# Patient Record
Sex: MALE | Race: WHITE | Employment: OTHER | URBAN - METROPOLITAN AREA
[De-identification: names, ages, dates, MRNs, and addresses within clinical notes are randomized per-mention and may not be internally consistent; named-entity substitution may affect disease eponyms.]

---

## 2017-05-30 DIAGNOSIS — I10 ESSENTIAL HYPERTENSION: ICD-10-CM

## 2017-05-31 NOTE — TELEPHONE ENCOUNTER
Amlodipine      Last Written Prescription Date: 2/27/17  Last Fill Quantity: 90, # refills: 0  Last Office Visit with [facility], [facility], [facility] or [facility] prescribing provider: 11/21/16       Potassium   Date Value Ref Range Status   11/25/2016 4.9 3.4 - 5.3 mmol/L Final     Creatinine   Date Value Ref Range Status   11/25/2016 1.12 0.66 - 1.25 mg/dL Final     BP Readings from Last 3 Encounters:   11/21/16 142/80   06/07/16 148/88   05/12/16 140/78

## 2017-06-01 RX ORDER — AMLODIPINE BESYLATE 10 MG/1
TABLET ORAL
Qty: 90 TABLET | Refills: 1 | Status: SHIPPED | OUTPATIENT
Start: 2017-06-01 | End: 2017-08-14

## 2017-08-14 ENCOUNTER — OFFICE VISIT (OUTPATIENT)
Dept: FAMILY MEDICINE | Facility: OTHER | Age: 62
End: 2017-08-14
Attending: NURSE PRACTITIONER
Payer: COMMERCIAL

## 2017-08-14 VITALS
BODY MASS INDEX: 30.91 KG/M2 | WEIGHT: 220.8 LBS | TEMPERATURE: 96.4 F | DIASTOLIC BLOOD PRESSURE: 78 MMHG | OXYGEN SATURATION: 98 % | HEIGHT: 71 IN | HEART RATE: 67 BPM | RESPIRATION RATE: 18 BRPM | SYSTOLIC BLOOD PRESSURE: 142 MMHG

## 2017-08-14 DIAGNOSIS — Z72.0 TOBACCO ABUSE: Primary | ICD-10-CM

## 2017-08-14 DIAGNOSIS — M79.672 LEFT FOOT PAIN: ICD-10-CM

## 2017-08-14 DIAGNOSIS — E11.9 TYPE 2 DIABETES MELLITUS WITHOUT COMPLICATION, WITHOUT LONG-TERM CURRENT USE OF INSULIN (H): ICD-10-CM

## 2017-08-14 DIAGNOSIS — E78.5 HYPERLIPIDEMIA LDL GOAL <100: ICD-10-CM

## 2017-08-14 DIAGNOSIS — I10 BENIGN ESSENTIAL HYPERTENSION: ICD-10-CM

## 2017-08-14 DIAGNOSIS — I10 ESSENTIAL HYPERTENSION: ICD-10-CM

## 2017-08-14 DIAGNOSIS — Z71.6 TOBACCO ABUSE COUNSELING: ICD-10-CM

## 2017-08-14 LAB
ALBUMIN SERPL-MCNC: 4.1 G/DL (ref 3.4–5)
ALBUMIN UR-MCNC: ABNORMAL MG/DL
ALP SERPL-CCNC: 67 U/L (ref 40–150)
ALT SERPL W P-5'-P-CCNC: 37 U/L (ref 0–70)
ANION GAP SERPL CALCULATED.3IONS-SCNC: 10 MMOL/L (ref 3–14)
APPEARANCE UR: CLEAR
AST SERPL W P-5'-P-CCNC: 44 U/L (ref 0–45)
BILIRUB DIRECT SERPL-MCNC: 0.2 MG/DL (ref 0–0.2)
BILIRUB SERPL-MCNC: 0.5 MG/DL (ref 0.2–1.3)
BILIRUB UR QL STRIP: NEGATIVE
BUN SERPL-MCNC: 18 MG/DL (ref 7–30)
CALCIUM SERPL-MCNC: 9.2 MG/DL (ref 8.5–10.1)
CHLORIDE SERPL-SCNC: 105 MMOL/L (ref 94–109)
CHOLEST SERPL-MCNC: 223 MG/DL
CO2 SERPL-SCNC: 22 MMOL/L (ref 20–32)
COLOR UR AUTO: YELLOW
CREAT SERPL-MCNC: 1 MG/DL (ref 0.66–1.25)
CREAT UR-MCNC: 114 MG/DL
EST. AVERAGE GLUCOSE BLD GHB EST-MCNC: 163 MG/DL
GFR SERPL CREATININE-BSD FRML MDRD: 75 ML/MIN/1.7M2
GLUCOSE SERPL-MCNC: 184 MG/DL (ref 70–99)
GLUCOSE UR STRIP-MCNC: NEGATIVE MG/DL
HBA1C MFR BLD: 7.3 % (ref 4.3–6)
HDLC SERPL-MCNC: 33 MG/DL
HGB UR QL STRIP: NEGATIVE
KETONES UR STRIP-MCNC: NEGATIVE MG/DL
LDLC SERPL CALC-MCNC: 157 MG/DL
LEUKOCYTE ESTERASE UR QL STRIP: NEGATIVE
MICROALBUMIN UR-MCNC: 117 MG/L
MICROALBUMIN/CREAT UR: 102.63 MG/G CR (ref 0–17)
NITRATE UR QL: NEGATIVE
NONHDLC SERPL-MCNC: 190 MG/DL
PH UR STRIP: 5.5 PH (ref 5–7)
POTASSIUM SERPL-SCNC: 5.2 MMOL/L (ref 3.4–5.3)
PROT SERPL-MCNC: 8.8 G/DL (ref 6.8–8.8)
RBC #/AREA URNS AUTO: NORMAL /HPF (ref 0–2)
SODIUM SERPL-SCNC: 137 MMOL/L (ref 133–144)
SP GR UR STRIP: 1.02 (ref 1–1.03)
TRIGL SERPL-MCNC: 167 MG/DL
TSH SERPL DL<=0.005 MIU/L-ACNC: 2.76 MU/L (ref 0.4–4)
URN SPEC COLLECT METH UR: ABNORMAL
UROBILINOGEN UR STRIP-ACNC: 0.2 EU/DL (ref 0.2–1)
WBC #/AREA URNS AUTO: NORMAL /HPF (ref 0–2)

## 2017-08-14 PROCEDURE — 36415 COLL VENOUS BLD VENIPUNCTURE: CPT | Performed by: NURSE PRACTITIONER

## 2017-08-14 PROCEDURE — 84443 ASSAY THYROID STIM HORMONE: CPT | Performed by: NURSE PRACTITIONER

## 2017-08-14 PROCEDURE — 82043 UR ALBUMIN QUANTITATIVE: CPT | Performed by: NURSE PRACTITIONER

## 2017-08-14 PROCEDURE — 99214 OFFICE O/P EST MOD 30 MIN: CPT | Performed by: NURSE PRACTITIONER

## 2017-08-14 PROCEDURE — 80061 LIPID PANEL: CPT | Performed by: NURSE PRACTITIONER

## 2017-08-14 PROCEDURE — 83036 HEMOGLOBIN GLYCOSYLATED A1C: CPT | Performed by: NURSE PRACTITIONER

## 2017-08-14 PROCEDURE — 80076 HEPATIC FUNCTION PANEL: CPT | Performed by: NURSE PRACTITIONER

## 2017-08-14 PROCEDURE — 80048 BASIC METABOLIC PNL TOTAL CA: CPT | Performed by: NURSE PRACTITIONER

## 2017-08-14 PROCEDURE — 81001 URINALYSIS AUTO W/SCOPE: CPT | Performed by: NURSE PRACTITIONER

## 2017-08-14 RX ORDER — AMLODIPINE BESYLATE 10 MG/1
10 TABLET ORAL DAILY
Qty: 90 TABLET | Refills: 3 | Status: SHIPPED | OUTPATIENT
Start: 2017-08-14 | End: 2018-09-24

## 2017-08-14 RX ORDER — ATENOLOL 25 MG/1
25 TABLET ORAL DAILY
Qty: 90 TABLET | Refills: 3 | Status: SHIPPED | OUTPATIENT
Start: 2017-08-14 | End: 2017-09-14

## 2017-08-14 RX ORDER — GEMFIBROZIL 600 MG/1
600 TABLET, FILM COATED ORAL
Qty: 180 TABLET | Refills: 3 | Status: SHIPPED | OUTPATIENT
Start: 2017-08-14

## 2017-08-14 ASSESSMENT — ANXIETY QUESTIONNAIRES
6. BECOMING EASILY ANNOYED OR IRRITABLE: NOT AT ALL
3. WORRYING TOO MUCH ABOUT DIFFERENT THINGS: NOT AT ALL
2. NOT BEING ABLE TO STOP OR CONTROL WORRYING: NOT AT ALL
1. FEELING NERVOUS, ANXIOUS, OR ON EDGE: NOT AT ALL
IF YOU CHECKED OFF ANY PROBLEMS ON THIS QUESTIONNAIRE, HOW DIFFICULT HAVE THESE PROBLEMS MADE IT FOR YOU TO DO YOUR WORK, TAKE CARE OF THINGS AT HOME, OR GET ALONG WITH OTHER PEOPLE: NOT DIFFICULT AT ALL
7. FEELING AFRAID AS IF SOMETHING AWFUL MIGHT HAPPEN: NOT AT ALL
4. TROUBLE RELAXING: NOT AT ALL
GAD7 TOTAL SCORE: 0
5. BEING SO RESTLESS THAT IT IS HARD TO SIT STILL: NOT AT ALL

## 2017-08-14 ASSESSMENT — PATIENT HEALTH QUESTIONNAIRE - PHQ9: SUM OF ALL RESPONSES TO PHQ QUESTIONS 1-9: 4

## 2017-08-14 ASSESSMENT — PAIN SCALES - GENERAL: PAINLEVEL: MODERATE PAIN (4)

## 2017-08-14 NOTE — PROGRESS NOTES
SUBJECTIVE:                                                    Seth Kendrick is a 61 year old male who presents to clinic today for the following health issues:    Follow up Diabeties, hypertension and lipids  Diabetes Follow-up      Patient is checking blood sugars: not at all    Diabetic concerns: None     Symptoms of hypoglycemia (low blood sugar): none     Paresthesias (numbness or burning in feet) or sores: Yes tingle     Date of last diabetic eye exam: 2015    Hyperlipidemia Follow-Up      Rate your low fat/cholesterol diet?: not monitoring fat    Taking statin?  No    Other lipid medications/supplements?:  Gemfibrozil, without side effects    Hypertension Follow-up      Outpatient blood pressures are not being checked.    Low Salt Diet: not monitoring salt        Amount of exercise or physical activity: None    Problems taking medications regularly: Yes,  problems remembering to take    Medication side effects: none  Diet: regular (no restrictions)      Musculoskeletal problem/pain      Duration: 2 weeks    Description  Location: left medial ankle    Intensity:  mild    Accompanying signs and symptoms: none    History  Previous similar problem: YES  Previous evaluation:  none    Precipitating or alleviating factors:  Trauma or overuse: does not recall any injury  Aggravating factors include: worse after sitting, better after moving around    Therapies tried and outcome: nothing        Problem list and histories reviewed & adjusted, as indicated.  Additional history: as documented    Patient Active Problem List   Diagnosis     Type 2 diabetes mellitus without complication, without long-term current use of insulin (H)     Benign essential hypertension     Hyperlipidemia LDL goal <100     Osteoarthritis     Advanced care planning/counseling discussion     ACP (advance care planning)     Past Surgical History:   Procedure Laterality Date     abd cyst/tumor removal      Cancer     APPENDECTOMY        COLONOSCOPY  age 49    colonoscopy      excision of cyst      Left neck cyst     GENITOURINARY SURGERY      testicular cancer     HEAD & NECK SURGERY      parotid tumor right x 2     herniated disc       left testicle removal      Cancer     ORTHOPEDIC SURGERY      back surgery       Social History   Substance Use Topics     Smoking status: Never Smoker     Smokeless tobacco: Current User     Types: Chew      Comment: per day .33. yrs used 36, tried to quit yes, yr quit 2011, no passive exposure     Alcohol use Yes      Comment: 4 glasses of whiskey weekly     Family History   Problem Relation Age of Onset     DIABETES Father      Hypertension Father      Lupus Mother 73     erythematosus, cause of death         Current Outpatient Prescriptions   Medication Sig Dispense Refill     amLODIPine (NORVASC) 10 MG tablet TAKE 1 TABLET BY MOUTH DAILY 90 tablet 1     atenolol (TENORMIN) 25 MG tablet Take 1 tablet (25 mg) by mouth daily 90 tablet 3     gemfibrozil (LOPID) 600 MG tablet Take 1 tablet (600 mg) by mouth 2 times daily (before meals) 180 tablet 3     STATIN NOT PRESCRIBED, INTENTIONAL, Statin not prescribed intentionally due to Allergy to statin 0 each 0     metFORMIN (GLUCOPHAGE) 1000 MG tablet Take 1 tablet (1,000 mg) by mouth 2 times daily (with meals) TAKE 1 TABLET (1,000 MG) BY MOUTH DAILY (WITH BREAKFAST) 180 tablet 3     sildenafil (VIAGRA) 50 MG tablet Take 1 tablet (50 mg) by mouth daily as needed 30 tablet 2     aspirin 81 MG tablet Take 81 mg by mouth daily.       Allergies   Allergen Reactions     Hmg-Coa-R Inhibitors      States when taking unable to lift arms     Ampicillin      Atorvastatin Calcium Other (See Comments)     Muscle aches  Lipitor       Ezetimibe Other (See Comments)     Muscle aches  Zetia         Lisinopril Other (See Comments)     Angioedema       Losartan Potassium Other (See Comments)     Swollen lips, mouth  Cozaar       Lovastatin Other (See Comments)     Muscle aches    "    Recent Labs   Lab Test  11/25/16   0908  05/12/16   0844  11/12/15   0919  08/07/15   1109  10/28/14   1039   A1C  6.8*  7.4*  6.4*  6.5*  5.9   LDL  179*  135*  155*  126  123   HDL  44  42  51  47  61   TRIG  158*  119  89  85  103   ALT   --    --    --   57  53   CR  1.12  0.93  1.03  0.90  1.03   GFRESTIMATED  66  83  73  86  73   GFRESTBLACK  80  >90   GFR Calc    88  >90   GFR Calc    89   POTASSIUM  4.9  4.6  4.7  4.8  4.3   TSH  2.53  2.09  1.32  1.44  1.98      BP Readings from Last 3 Encounters:   08/14/17 142/78   11/21/16 142/80   06/07/16 148/88    Wt Readings from Last 3 Encounters:   08/14/17 220 lb 12.8 oz (100.2 kg)   11/21/16 224 lb (101.6 kg)   06/07/16 216 lb (98 kg)            ROS:  Constitutional, HEENT, cardiovascular, pulmonary, gi and gu systems are negative, except as otherwise noted.      OBJECTIVE:   /78 (BP Location: Left arm, Patient Position: Chair, Cuff Size: Adult Large)  Pulse 67  Temp 96.4  F (35.8  C) (Tympanic)  Resp 18  Ht 5' 11\" (1.803 m)  Wt 220 lb 12.8 oz (100.2 kg)  SpO2 98%  BMI 30.8 kg/m2  Body mass index is 30.8 kg/(m^2).   GENERAL: healthy, alert and no distress  NECK: no adenopathy, no asymmetry, masses, or scars, thyroid normal to palpation and no carotid bruits  RESP: lungs clear to auscultation - no rales, rhonchi or wheezes  CV: regular rate and rhythm, normal S1 S2, no S3 or S4, no murmur, click or rub, no peripheral edema and peripheral pulses strong  MS: no gross musculoskeletal defects noted, no edema.  Left ankle ROM intact and not limited by pain.  No swelling or erythema noted.  No tenderness on palpation.    PSYCH: mentation appears normal, affect normal/bright    Diagnostic Test Results:  Results for orders placed or performed in visit on 08/14/17 (from the past 24 hour(s))   TSH with free T4 reflex   Result Value Ref Range    TSH 2.76 0.40 - 4.00 mU/L   BASIC METABOLIC PANEL   Result Value Ref Range    " Sodium 137 133 - 144 mmol/L    Potassium 5.2 3.4 - 5.3 mmol/L    Chloride 105 94 - 109 mmol/L    Carbon Dioxide 22 20 - 32 mmol/L    Anion Gap 10 3 - 14 mmol/L    Glucose 184 (H) 70 - 99 mg/dL    Urea Nitrogen 18 7 - 30 mg/dL    Creatinine 1.00 0.66 - 1.25 mg/dL    GFR Estimate 75 >60 mL/min/1.7m2    GFR Estimate If Black >90   GFR Calc   >60 mL/min/1.7m2    Calcium 9.2 8.5 - 10.1 mg/dL   Hemoglobin A1c   Result Value Ref Range    Hemoglobin A1C 7.3 (H) 4.3 - 6.0 %   Lipid Profile   Result Value Ref Range    Cholesterol 223 (H) <200 mg/dL    Triglycerides 167 (H) <150 mg/dL    HDL Cholesterol 33 (L) >39 mg/dL    LDL Cholesterol Calculated 157 (H) <100 mg/dL    Non HDL Cholesterol 190 (H) <130 mg/dL   Hepatic panel   Result Value Ref Range    Bilirubin Direct 0.2 0.0 - 0.2 mg/dL    Bilirubin Total 0.5 0.2 - 1.3 mg/dL    Albumin 4.1 3.4 - 5.0 g/dL    Protein Total 8.8 6.8 - 8.8 g/dL    Alkaline Phosphatase 67 40 - 150 U/L    ALT 37 0 - 70 U/L    AST 44 0 - 45 U/L   Estimated Average Glucose   Result Value Ref Range    Estimated Average Glucose 163 mg/dL   Albumin Random Urine Quantitative   Result Value Ref Range    Creatinine Urine 114 mg/dL    Albumin Urine mg/L 117 mg/L    Albumin Urine mg/g Cr 102.63 (H) 0 - 17 mg/g Cr   *UA reflex to Microscopic and Culture - [facility]   Result Value Ref Range    Color Urine Yellow     Appearance Urine Clear     Glucose Urine Negative NEG mg/dL    Bilirubin Urine Negative NEG    Ketones Urine Negative NEG mg/dL    Specific Gravity Urine 1.025 1.003 - 1.035    Blood Urine Negative NEG    pH Urine 5.5 5.0 - 7.0 pH    Protein Albumin Urine Trace (A) NEG mg/dL    Urobilinogen Urine 0.2 0.2 - 1.0 EU/dL    Nitrite Urine Negative NEG    Leukocyte Esterase Urine Negative NEG    Source Midstream Urine    Urine Microscopic   Result Value Ref Range    WBC Urine O - 2 0 - 2 /HPF    RBC Urine O - 2 0 - 2 /HPF       ASSESSMENT/PLAN:  1. Tobacco abuse  Encouraged to stop  chewing  - Tobacco Cessation - for Health Maintenance    2. Tobacco abuse counseling  As above    3. Benign essential hypertension  chronic  - TSH with free T4 reflex  - BASIC METABOLIC PANEL  - atenolol (TENORMIN) 25 MG tablet; Take 1 tablet (25 mg) by mouth daily  Dispense: 90 tablet; Refill: 3  - amLODIPine (NORVASC) 10 MG tablet; Take 1 tablet (10 mg) by mouth daily  Dispense: 90 tablet; Refill: 3    4. Hyperlipidemia LDL goal <100  chronic  - Lipid Profile  - Hepatic panel  - gemfibrozil (LOPID) 600 MG tablet; Take 1 tablet (600 mg) by mouth 2 times daily (before meals)  Dispense: 180 tablet; Refill: 3    5. Type 2 diabetes mellitus without complication, without long-term current use of insulin (H)  chronic  - Albumin Random Urine Quantitative  - metFORMIN (GLUCOPHAGE) 1000 MG tablet; Take 1 tablet (1,000 mg) by mouth 2 times daily (with meals) TAKE 1 TABLET (1,000 MG) BY MOUTH DAILY (WITH BREAKFAST)  Dispense: 180 tablet; Refill: 3  - Hemoglobin A1c  - *UA reflex to Microscopic and Culture - [facility]    6. Left foot pain  Declined XR today.  States is mild and does not interfere with activities.  Discussed possible etiologies including tendonous injury/strain to arthritis.  He will try conservative measures - ice, heat, NSAID and follow-up if no improvement.          FUTURE APPOINTMENTS:       - Follow-up visit in 6 months or as needed for acute concerns.     Maddie Arzate, NP  [facility]

## 2017-08-14 NOTE — PATIENT INSTRUCTIONS
ASSESSMENT/PLAN:  1. Tobacco abuse  Encouraged to stop chewing  - Tobacco Cessation - for Health Maintenance    2. Tobacco abuse counseling  As above    3. Benign essential hypertension  chronic  - TSH with free T4 reflex  - BASIC METABOLIC PANEL  - atenolol (TENORMIN) 25 MG tablet; Take 1 tablet (25 mg) by mouth daily  Dispense: 90 tablet; Refill: 3  - amLODIPine (NORVASC) 10 MG tablet; Take 1 tablet (10 mg) by mouth daily  Dispense: 90 tablet; Refill: 3    4. Hyperlipidemia LDL goal <100  chronic  - Lipid Profile  - Hepatic panel  - gemfibrozil (LOPID) 600 MG tablet; Take 1 tablet (600 mg) by mouth 2 times daily (before meals)  Dispense: 180 tablet; Refill: 3    5. Type 2 diabetes mellitus without complication, without long-term current use of insulin (H)  chronic  - Albumin Random Urine Quantitative  - metFORMIN (GLUCOPHAGE) 1000 MG tablet; Take 1 tablet (1,000 mg) by mouth 2 times daily (with meals) TAKE 1 TABLET (1,000 MG) BY MOUTH DAILY (WITH BREAKFAST)  Dispense: 180 tablet; Refill: 3  - Hemoglobin A1c  - *UA reflex to Microscopic and Culture - [facility]        FUTURE APPOINTMENTS:       - Follow-up visit in 6 months or as needed for acute concerns.     Maddie Arzate NP    HOW TO QUIT SMOKING  Smoking is one of the hardest habits to break. About half of all those who have ever smoked have been able to quit, and most of those (about 70%) who still smoke want to quit. Here are some of the best ways to stop smoking.     KEEP TRYING:  It takes most smokers about 8 tries before they are finally able to fully quit. So, the more often you try and fail, the better your chance of quitting the next time! So, don't give up!    GO COLD TURKEY:  Most ex-smokers quit cold turkey. Trying to cut back gradually doesn't seem to work as well, perhaps because it continues the smoking habit. Also, it is possible to fool yourself by inhaling more while smoking fewer cigarettes. This results in the same amount  of nicotine in your body!    GET SUPPORT:  Support programs can make an important difference, especially for the heavy smoker. These groups offer lectures, methods to change your behavior and peer support. Call the free national Quitline for more information. 800-QUIT-NOW (748-265-5765). Low-cost or free programs are offered by many hospitals, local chapters of the American Lung Association (650-040-5596) and the American Cancer Society (494-797-6302). Support at home is important too. Non-smokers can help by offering praise and encouragement. If the smoker fails to quit, encourage them to try again!    OVER-THE-COUNTER MEDICINES:  For those who can't quit on their own, Nicotine Replacement Therapy (NRT) may make quitting much easier. Certain aids such as the nicotine patch, gum and lozenge are available without a prescription. However, it is best to use these under the guidance of your doctor. The skin patch provides a steady supply of nicotine to the body. Nicotine gum and lozenge gives temporary bursts of low levels of nicotine. Both methods take the edge off the craving for cigarettes. WARNING: If you feel symptoms of nicotine overdose, such as nausea, vomiting, dizziness, weakness, or fast heartbeat, stop using these and see your doctor.    PRESCRIPTION MEDICINES:  After evaluating your smoking patterns and prior attempts at quitting, your doctor may offer a prescription medicine such as bupropion (Zyban, Wellbutrin), varenicline (Chantix, Champix), a niocotine inhaler or nasal spray. Each has its unique advantage and side effects which your doctor can review with you.    HEALTH BENEFITS OF QUITTING:  The benefits of quitting start right away and keep improving the longer you go without smokin minutes: blood pressure and pulse return to normal  8 hours: oxygen levels return to normal  2 days: ability to smell and taste begins to improve as damaged nerves start to regrow  2-3 weeks: circulation and lung  function improves  1-9 months: decreased cough, congestion and shortness of breath; less tired  1 year: risk of heart attack decreases by half  5 years: risk of lung cancer decreases by half; risk of stroke becomes the same as a non-smoker  For information about how to quit smoking, visit the following links:  [employer] ,   Clearing the Air, Quit Smoking Today   - an online booklet. http://www.smokefree.gov/pubs/clearing_the_air.pdf  Smokefree.gov http://smokefree.gov/  QuitNet http://www.quitnet.com/    9034-0313 Sharonda Jaquez, [address] [place] [address], [place] [address]. All rights reserved. This information is not intended as a substitute for professional medical care. Always follow your healthcare professional's instructions.    The Benefits of Living Smoke Free  What do you want to gain from quitting? Check off some reasons to quit.  Health Benefits  ___ Reduce my risk of lung cancer, heart disease, chronic lung disease  ___ Have fewer wrinkles and softer skin  ___ Improve my sense of taste and smell  ___ For pregnant women reduce the risk of having a miscarriage, stillbirth, premature birth, or low-birth-weight baby  Personal Benefits  ___ Feel more in control of my life  ___ Have better-smelling hair, breath, clothes, home, and car  ___ Save time by not having to take smoke breaks, buy cigarettes, or hunt for a light  ___ Have whiter teeth  Family Benefits  ___ Reduce my children s respiratory tract infections  ___ Set a good example for my children  ___ Reduce my family s cancer risk  Financial Benefits  ___ Save hundreds of dollars each year that would be spent on cigarettes  ___ Save money on medical bills  ___ Save on life, health, and car insurance premiums    Those Dollars Add Up!  Cigarettes are expensive, and getting more expensive all the time. Do you realize how much money you are spending on cigarettes per year? What is the average amount you spend on a pack of cigarettes? What is  the average number of packs that you smoke per day? Using your answers to these questions, fill in this formula to help you find out:  ($ _____ per pack) ×  ( _____ number of packs per day) × (365 days) =  $ _____ yearly cost of smoking  Besides tobacco, there are other costs, including extra cleaning bills and replacement costs for clothing and furniture; medical expenses for smoking-related illnesses; and higher health, life, and car insurance premiums.    Cigars and Pipes Count Too!  Cigars and pipes are also dangerous. So are smokeless (chewing) tobacco and snuff. All of these products contain nicotine, a highly addictive substance that has harmful effects on your body. Quitting smoking means giving up all tobacco products.      3699-6824 [facility], [address] [place] [address], [place] [address]. All rights reserved. This information is not intended as a substitute for professional medical care. Always follow your healthcare professional's instructions.

## 2017-08-14 NOTE — MR AVS SNAPSHOT
After Visit Summary   8/14/2017    Seth Kendrick    MRN: [id number]           Patient Information     Date Of Birth          1955        Visit Information        Provider Department      8/14/2017 8:45 AM Maddie Arzate NP [facility]        Today's Diagnoses     Tobacco abuse    -  1    Tobacco abuse counseling        Benign essential hypertension        Hyperlipidemia LDL goal <100        Type 2 diabetes mellitus without complication, without long-term current use of insulin (H)        Essential hypertension          Care Instructions      ASSESSMENT/PLAN:  1. Tobacco abuse  Encouraged to stop chewing  - Tobacco Cessation - for Health Maintenance    2. Tobacco abuse counseling  As above    3. Benign essential hypertension  chronic  - TSH with free T4 reflex  - BASIC METABOLIC PANEL  - atenolol (TENORMIN) 25 MG tablet; Take 1 tablet (25 mg) by mouth daily  Dispense: 90 tablet; Refill: 3  - amLODIPine (NORVASC) 10 MG tablet; Take 1 tablet (10 mg) by mouth daily  Dispense: 90 tablet; Refill: 3    4. Hyperlipidemia LDL goal <100  chronic  - Lipid Profile  - Hepatic panel  - gemfibrozil (LOPID) 600 MG tablet; Take 1 tablet (600 mg) by mouth 2 times daily (before meals)  Dispense: 180 tablet; Refill: 3    5. Type 2 diabetes mellitus without complication, without long-term current use of insulin (H)  chronic  - Albumin Random Urine Quantitative  - metFORMIN (GLUCOPHAGE) 1000 MG tablet; Take 1 tablet (1,000 mg) by mouth 2 times daily (with meals) TAKE 1 TABLET (1,000 MG) BY MOUTH DAILY (WITH BREAKFAST)  Dispense: 180 tablet; Refill: 3  - Hemoglobin A1c  - *UA reflex to Microscopic and Culture - [facility]        FUTURE APPOINTMENTS:       - Follow-up visit in 6 months or as needed for acute concerns.     Maddie Arzate, NP    HOW TO QUIT SMOKING  Smoking is one of the hardest habits to break. About half of all those who have ever smoked have been able to quit,  and most of those (about 70%) who still smoke want to quit. Here are some of the best ways to stop smoking.     KEEP TRYING:  It takes most smokers about 8 tries before they are finally able to fully quit. So, the more often you try and fail, the better your chance of quitting the next time! So, don't give up!    GO COLD TURKEY:  Most ex-smokers quit cold turkey. Trying to cut back gradually doesn't seem to work as well, perhaps because it continues the smoking habit. Also, it is possible to fool yourself by inhaling more while smoking fewer cigarettes. This results in the same amount of nicotine in your body!    GET SUPPORT:  Support programs can make an important difference, especially for the heavy smoker. These groups offer lectures, methods to change your behavior and peer support. Call the free national Quitline for more information. 800-QUIT-NOW (909-995-9859). Low-cost or free programs are offered by many hospitals, local chapters of the American Lung Association (990-119-3054) and the American Cancer Society (451-998-6766). Support at home is important too. Non-smokers can help by offering praise and encouragement. If the smoker fails to quit, encourage them to try again!    OVER-THE-COUNTER MEDICINES:  For those who can't quit on their own, Nicotine Replacement Therapy (NRT) may make quitting much easier. Certain aids such as the nicotine patch, gum and lozenge are available without a prescription. However, it is best to use these under the guidance of your doctor. The skin patch provides a steady supply of nicotine to the body. Nicotine gum and lozenge gives temporary bursts of low levels of nicotine. Both methods take the edge off the craving for cigarettes. WARNING: If you feel symptoms of nicotine overdose, such as nausea, vomiting, dizziness, weakness, or fast heartbeat, stop using these and see your doctor.    PRESCRIPTION MEDICINES:  After evaluating your smoking patterns and prior attempts at  quitting, your doctor may offer a prescription medicine such as bupropion (Zyban, Wellbutrin), varenicline (Chantix, Champix), a niocotine inhaler or nasal spray. Each has its unique advantage and side effects which your doctor can review with you.    HEALTH BENEFITS OF QUITTING:  The benefits of quitting start right away and keep improving the longer you go without smokin minutes: blood pressure and pulse return to normal  8 hours: oxygen levels return to normal  2 days: ability to smell and taste begins to improve as damaged nerves start to regrow  2-3 weeks: circulation and lung function improves  1-9 months: decreased cough, congestion and shortness of breath; less tired  1 year: risk of heart attack decreases by half  5 years: risk of lung cancer decreases by half; risk of stroke becomes the same as a non-smoker  For information about how to quit smoking, visit the following links:  National Cancer [place] ,   Clearing the Air, Quit Smoking Today   - an online booklet. http://www.smokefree.gov/pubs/clearing_the_air.pdf  Smokefree.gov http://smokefree.gov/  QuitNet http://www.quitnet.com/    9597-6803 [facility], [address] [place] [address]. All rights reserved. This information is not intended as a substitute for professional medical care. Always follow your healthcare professional's instructions.    The Benefits of Living Smoke Free  What do you want to gain from quitting? Check off some reasons to quit.  Health Benefits  ___ Reduce my risk of lung cancer, heart disease, chronic lung disease  ___ Have fewer wrinkles and softer skin  ___ Improve my sense of taste and smell  ___ For pregnant women--reduce the risk of having a miscarriage, stillbirth, premature birth, or low-birth-weight baby  Personal Benefits  ___ Feel more in control of my life  ___ Have better-smelling hair, breath, clothes, home, and car  ___ Save time by not having to take smoke breaks, buy cigarettes, or hunt for  a light  ___ Have whiter teeth  Family Benefits  ___ Reduce my children s respiratory tract infections  ___ Set a good example for my children  ___ Reduce my family s cancer risk  Financial Benefits  ___ Save hundreds of dollars each year that would be spent on cigarettes  ___ Save money on medical bills  ___ Save on life, health, and car insurance premiums    Those Dollars Add Up!  Cigarettes are expensive, and getting more expensive all the time. Do you realize how much money you are spending on cigarettes per year? What is the average amount you spend on a pack of cigarettes? What is the average number of packs that you smoke per day? Using your answers to these questions, fill in this formula to help you find out:  ($ _____ per pack) ×  ( _____ number of packs per day) × (365 days) =  $ _____ yearly cost of smoking  Besides tobacco, there are other costs, including extra cleaning bills and replacement costs for clothing and furniture; medical expenses for smoking-related illnesses; and higher health, life, and car insurance premiums.    Cigars and Pipes Count Too!  Cigars and pipes are also dangerous. So are smokeless (chewing) tobacco and snuff. All of these products contain nicotine, a highly addictive substance that has harmful effects on your body. Quitting smoking means giving up all tobacco products.      5102-6422 [facility], [address] [place] [address]. All rights reserved. This information is not intended as a substitute for professional medical care. Always follow your healthcare professional's instructions.          Follow-ups after your visit        Who to contact     If you have questions or need follow up information about today's clinic visit or your schedule please contact [facility] directly at [phone].  Normal or non-critical lab and imaging results will be communicated to you by MyChart, letter or phone within 4 business days after the clinic has received  "the results. If you do not hear from us within 7 days, please contact the clinic through [employer] or phone. If you have a critical or abnormal lab result, we will notify you by phone as soon as possible.  Submit refill requests through [employer] or call your pharmacy and they will forward the refill request to us. Please allow 3 business days for your refill to be completed.          Additional Information About Your Visit        [employer] Information     [employer] lets you send messages to your doctor, view your test results, renew your prescriptions, schedule appointments and more. To sign up, go to [email or web address] . Click on \"Log in\" on the left side of the screen, which will take you to the Welcome page. Then click on \"Sign up Now\" on the right side of the page.     You will be asked to enter the access code listed below, as well as some personal information. Please follow the directions to create your username and password.     Your access code is: [email or web address]  Expires: 2017  9:17 AM     Your access code will  in 90 days. If you need help or a new code, please call your [place] clinic or [phone].        Care EveryWhere ID     This is your Care EveryWhere ID. This could be used by other organizations to access your [place] medical records  [id number]        Your Vitals Were     Pulse Temperature Respirations Height Pulse Oximetry BMI (Body Mass Index)    67 96.4  F (35.8  C) (Tympanic) 18 5' 11\" (1.803 m) 98% 30.8 kg/m2       Blood Pressure from Last 3 Encounters:   17 142/78   16 142/80   16 148/88    Weight from Last 3 Encounters:   17 220 lb 12.8 oz (100.2 kg)   16 224 lb (101.6 kg)   16 216 lb (98 kg)              We Performed the Following     *UA reflex to Microscopic and Culture - [facility]     Albumin Random Urine Quantitative     BASIC METABOLIC PANEL     Hemoglobin A1c     Hepatic panel     Lipid Profile     Tobacco Cessation - for " Health Maintenance     TSH with free T4 reflex          Today's Medication Changes          These changes are accurate as of: 8/14/17  9:17 AM.  If you have any questions, ask your nurse or doctor.               These medicines have changed or have updated prescriptions.        Dose/Directions    amLODIPine 10 MG tablet   Commonly known as:  NORVASC   This may have changed:  See the new instructions.   Used for:  Essential hypertension   Changed by:  Maddie Arzate NP        Dose:  10 mg   Take 1 tablet (10 mg) by mouth daily   Quantity:  90 tablet   Refills:  3            Where to get your medicines      These medications were sent to [facility] [address]  AT [facility] [address] [facility] [address]     Phone:  [phone]     amLODIPine 10 MG tablet    atenolol 25 MG tablet    gemfibrozil 600 MG tablet    metFORMIN 1000 MG tablet                Primary Care Provider Office Phone # Fax #    Maddie Arzate [phone]       [facility] [address] [facility] [address]        Equal Access to Services     MARICHUY FAIRBANKS AH: Hadii aad ku hadasho Soomaali, waaxda luqadaha, qaybta kaalmada adeegyada, dov barron hayaan janeth ny . So [facility] [phone].    ATENCIÓN: Si habla español, tiene a almonte disposición servicios gratuitos de asistencia lingüística. [facility] [phone].    We comply with applicable federal civil rights laws and [place] laws. We do not discriminate on the basis of race, color, national origin, age, disability sex, sexual orientation or gender identity.            Thank you!     Thank you for choosing [facility]  for your care. Our goal is always to provide you with excellent care. Hearing back from our patients is one way we can continue to improve our services. Please take a few minutes to complete the written survey that you may receive in the mail  after your visit with us. Thank you!             Your Updated Medication List - Protect others around you: Learn how to safely use, store and throw away your medicines at www.disposemymeds.org.          This list is accurate as of: 8/14/17  9:17 AM.  Always use your most recent med list.                   Brand Name Dispense Instructions for use Diagnosis    amLODIPine 10 MG tablet    NORVASC    90 tablet    Take 1 tablet (10 mg) by mouth daily    Essential hypertension       aspirin 81 MG tablet      Take 81 mg by mouth daily.        atenolol 25 MG tablet    TENORMIN    90 tablet    Take 1 tablet (25 mg) by mouth daily    Benign essential hypertension       gemfibrozil 600 MG tablet    LOPID    180 tablet    Take 1 tablet (600 mg) by mouth 2 times daily (before meals)    Hyperlipidemia LDL goal <100       metFORMIN 1000 MG tablet    GLUCOPHAGE    180 tablet    Take 1 tablet (1,000 mg) by mouth 2 times daily (with meals) TAKE 1 TABLET (1,000 MG) BY MOUTH DAILY (WITH BREAKFAST)    Type 2 diabetes mellitus without complication, without long-term current use of insulin (H)       sildenafil 50 MG cap/tab    VIAGRA    30 tablet    Take 1 tablet (50 mg) by mouth daily as needed    ED (erectile dysfunction)       STATIN NOT PRESCRIBED (INTENTIONAL)     0 each    Statin not prescribed intentionally due to Allergy to statin    Hyperlipidemia LDL goal <100, Type 2 diabetes mellitus without complication (H)

## 2017-08-14 NOTE — NURSING NOTE
"Chief Complaint   Patient presents with     Diabetes     Lipids     Hypertension       Initial /78 (BP Location: Left arm, Patient Position: Chair, Cuff Size: Adult Large)  Pulse 67  Temp 96.4  F (35.8  C) (Tympanic)  Resp 18  Ht 5' 11\" (1.803 m)  Wt 220 lb 12.8 oz (100.2 kg)  SpO2 98%  BMI 30.8 kg/m2 Estimated body mass index is 30.8 kg/(m^2) as calculated from the following:    Height as of this encounter: 5' 11\" (1.803 m).    Weight as of this encounter: 220 lb 12.8 oz (100.2 kg).  Medication Reconciliation: complete   Pamela M Lechevalier LPN      "

## 2017-08-15 ASSESSMENT — ANXIETY QUESTIONNAIRES: GAD7 TOTAL SCORE: 0

## 2017-09-14 ENCOUNTER — TELEPHONE (OUTPATIENT)
Dept: FAMILY MEDICINE | Facility: OTHER | Age: 62
End: 2017-09-14

## 2017-09-14 DIAGNOSIS — E11.9 TYPE 2 DIABETES MELLITUS WITHOUT COMPLICATION, WITHOUT LONG-TERM CURRENT USE OF INSULIN (H): ICD-10-CM

## 2017-09-14 DIAGNOSIS — I10 BENIGN ESSENTIAL HYPERTENSION: Primary | ICD-10-CM

## 2017-09-14 RX ORDER — GLIMEPIRIDE 4 MG/1
4 TABLET ORAL
Qty: 90 TABLET | Refills: 1 | Status: SHIPPED | OUTPATIENT
Start: 2017-09-14 | End: 2018-03-14

## 2017-09-14 RX ORDER — METOPROLOL SUCCINATE 25 MG/1
25 TABLET, EXTENDED RELEASE ORAL DAILY
Qty: 90 TABLET | Refills: 3 | Status: SHIPPED | OUTPATIENT
Start: 2017-09-14 | End: 2018-09-24

## 2017-09-14 NOTE — TELEPHONE ENCOUNTER
New script for Amaryl 4mg before breakfast.  Please advise - This medication can cause hypoglycemia.  If symptoms present, treat with quick acting carb (1/2 regular pop, 1 cup milk, 1/2 cup regular juice or 4 glucose tablets) and please let us know - may need to adjust dose.

## 2017-09-21 DIAGNOSIS — E78.5 HYPERLIPIDEMIA LDL GOAL <100: ICD-10-CM

## 2017-09-21 RX ORDER — GEMFIBROZIL 600 MG/1
TABLET, FILM COATED ORAL
Qty: 180 TABLET | Refills: 2 | Status: SHIPPED | OUTPATIENT
Start: 2017-09-21

## 2017-09-21 NOTE — TELEPHONE ENCOUNTER
Gemfibrozil       Last Written Prescription Date: 8/14/17  Last Fill Quantity: 180, # refills: 3    Last Office Visit with [facility], [facility] or [facility] prescribing provider:  8/14/17   Future Office Visit:      Cholesterol   Date Value Ref Range Status   08/14/2017 223 (H) <200 mg/dL Final     Comment:     Desirable:       <200 mg/dl     HDL Cholesterol   Date Value Ref Range Status   08/14/2017 33 (L) >39 mg/dL Final     LDL Cholesterol Calculated   Date Value Ref Range Status   08/14/2017 157 (H) <100 mg/dL Final     Comment:     Above desirable:  100-129 mg/dl   Borderline High:  130-159 mg/dL   High:             160-189 mg/dL   Very high:       >189 mg/dl       Triglycerides   Date Value Ref Range Status   08/14/2017 167 (H) <150 mg/dL Final     Comment:     Borderline high:  150-199 mg/dl   High:             200-499 mg/dl   Very high:       >499 mg/dl   Fasting specimen       Cholesterol/HDL Ratio   Date Value Ref Range Status   11/12/2015 4.4 0.0 - 5.0 Final     ALT   Date Value Ref Range Status   08/14/2017 37 0 - 70 U/L Final      Mandi Garcia LPN

## 2017-10-13 ENCOUNTER — TRANSFERRED RECORDS (OUTPATIENT)
Dept: HEALTH INFORMATION MANAGEMENT | Facility: HOSPITAL | Age: 62
End: 2017-10-13

## 2018-02-02 ENCOUNTER — TELEPHONE (OUTPATIENT)
Dept: FAMILY MEDICINE | Facility: OTHER | Age: 63
End: 2018-02-02

## 2018-02-02 NOTE — TELEPHONE ENCOUNTER
Pt in [place] for winter and not sure when he will be able to come in d/t [employer] insurance not covered here but in September will be 63 and might be able to come back then  Pamela M Lechevalier LPN

## 2018-03-14 DIAGNOSIS — E11.9 TYPE 2 DIABETES MELLITUS WITHOUT COMPLICATION, WITHOUT LONG-TERM CURRENT USE OF INSULIN (H): ICD-10-CM

## 2018-03-14 NOTE — LETTER
[facility]  [address]  [place] [address]  Phone: [phone]    March 16, 2018       Seth Kendrick  [address] [facility] [place] [address]            Dear Seth:    We are concerned about your health care.  We recently provided you with medication refills.  Lab tests are required every 6 months in order to continue refilling your Glimepiride.  Orders have been placed in our computer and should be accessible at most [place]  labs. Please complete this lab work as soon as possible.        Thank You,      ChioNP

## 2018-03-14 NOTE — LETTER
[facility] [place]  [address] [place] [address]                Seth Kendrick  [address] [facility] [place] [address]      March 27, 2018       Dear Seth Kendrick,    APPOINTMENT REMINDER:       Our record indicates that it is time for you to be seen for an office visit with WYATT Osborne.  You may call our office at [phone] schedule an appointment.  Please disregard this notice if you have already made an appointment.      Sincerely,    WYATT Osborne

## 2018-03-16 RX ORDER — GLIMEPIRIDE 4 MG/1
TABLET ORAL
Qty: 30 TABLET | Refills: 0 | Status: SHIPPED | OUTPATIENT
Start: 2018-03-16

## 2018-03-16 RX ORDER — GLIMEPIRIDE 4 MG/1
TABLET ORAL
Qty: 30 TABLET | Refills: 0 | Status: SHIPPED | OUTPATIENT
Start: 2018-03-16 | End: 2018-03-16

## 2018-03-16 NOTE — TELEPHONE ENCOUNTER
1 month signed  Needs appt, pls arrange am fasting visit with Harmeet Tee.    Kimmie Almaguer [facility]  [phone]

## 2019-02-22 DIAGNOSIS — I10 ESSENTIAL HYPERTENSION: ICD-10-CM

## 2019-02-22 NOTE — TELEPHONE ENCOUNTER
Recall appt letter sent on 3.27.18. Pended.Thank you.    Vital Signs 5/12/2016 6/7/2016 6/7/2016 11/21/2016 11/21/2016   Systolic 140  148 148 142   Diastolic 78  88 86 80   Pulse 76  93 88      Vital Signs 8/14/2017 8/14/2017   Systolic  142   Diastolic  78   Pulse  67

## 2019-02-22 NOTE — TELEPHONE ENCOUNTER
amLODIPine (NORVASC) 10 MG tablet  Last Written Prescription Date:  11/30/18  Last Fill Quantity: 30,   # refills: 0  Last Office Visit: 8/14/17  Future Office visit:

## 2019-02-25 DIAGNOSIS — I10 ESSENTIAL HYPERTENSION: ICD-10-CM

## 2019-02-25 RX ORDER — AMLODIPINE BESYLATE 10 MG/1
TABLET ORAL
Qty: 90 TABLET | Refills: 0 | OUTPATIENT
Start: 2019-02-25

## 2019-02-28 RX ORDER — AMLODIPINE BESYLATE 10 MG/1
TABLET ORAL
Qty: 15 TABLET | Refills: 0 | Status: SHIPPED | OUTPATIENT
Start: 2019-02-28